# Patient Record
Sex: FEMALE | Race: OTHER
[De-identification: names, ages, dates, MRNs, and addresses within clinical notes are randomized per-mention and may not be internally consistent; named-entity substitution may affect disease eponyms.]

---

## 2020-04-13 ENCOUNTER — HOSPITAL ENCOUNTER (EMERGENCY)
Dept: HOSPITAL 61 - ER | Age: 40
LOS: 1 days | Discharge: HOME | End: 2020-04-14
Payer: SELF-PAY

## 2020-04-13 VITALS — HEIGHT: 64 IN | WEIGHT: 132.28 LBS | BODY MASS INDEX: 22.58 KG/M2

## 2020-04-13 DIAGNOSIS — Z87.442: ICD-10-CM

## 2020-04-13 DIAGNOSIS — R10.31: Primary | ICD-10-CM

## 2020-04-13 DIAGNOSIS — R11.0: ICD-10-CM

## 2020-04-13 LAB
ALBUMIN SERPL-MCNC: 3.5 G/DL (ref 3.4–5)
ALBUMIN/GLOB SERPL: 0.9 {RATIO} (ref 1–1.7)
ALP SERPL-CCNC: 52 U/L (ref 46–116)
ALT SERPL-CCNC: 18 U/L (ref 14–59)
ANION GAP SERPL CALC-SCNC: 8 MMOL/L (ref 6–14)
APTT PPP: YELLOW S
AST SERPL-CCNC: 16 U/L (ref 15–37)
BACTERIA #/AREA URNS HPF: 0 /HPF
BASOPHILS # BLD AUTO: 0 X10^3/UL (ref 0–0.2)
BASOPHILS NFR BLD: 0 % (ref 0–3)
BILIRUB SERPL-MCNC: 0.4 MG/DL (ref 0.2–1)
BILIRUB UR QL STRIP: NEGATIVE
BUN SERPL-MCNC: 12 MG/DL (ref 7–20)
BUN/CREAT SERPL: 17 (ref 6–20)
CALCIUM SERPL-MCNC: 8.9 MG/DL (ref 8.5–10.1)
CHLORIDE SERPL-SCNC: 105 MMOL/L (ref 98–107)
CO2 SERPL-SCNC: 26 MMOL/L (ref 21–32)
CREAT SERPL-MCNC: 0.7 MG/DL (ref 0.6–1)
EOSINOPHIL NFR BLD: 0.2 X10^3/UL (ref 0–0.7)
EOSINOPHIL NFR BLD: 2 % (ref 0–3)
ERYTHROCYTE [DISTWIDTH] IN BLOOD BY AUTOMATED COUNT: 12.4 % (ref 11.5–14.5)
FIBRINOGEN PPP-MCNC: (no result) MG/DL
GFR SERPLBLD BASED ON 1.73 SQ M-ARVRAT: 93.2 ML/MIN
GLOBULIN SER-MCNC: 4 G/DL (ref 2.2–3.8)
GLUCOSE SERPL-MCNC: 119 MG/DL (ref 70–99)
HCT VFR BLD CALC: 42.2 % (ref 36–47)
HGB BLD-MCNC: 14.6 G/DL (ref 12–15.5)
LYMPHOCYTES # BLD: 2.8 X10^3/UL (ref 1–4.8)
LYMPHOCYTES NFR BLD AUTO: 36 % (ref 24–48)
MCH RBC QN AUTO: 31 PG (ref 25–35)
MCHC RBC AUTO-ENTMCNC: 35 G/DL (ref 31–37)
MCV RBC AUTO: 91 FL (ref 79–100)
MONO #: 0.8 X10^3/UL (ref 0–1.1)
MONOCYTES NFR BLD: 10 % (ref 0–9)
NEUT #: 4 X10^3/UL (ref 1.8–7.7)
NEUTROPHILS NFR BLD AUTO: 52 % (ref 31–73)
NITRITE UR QL STRIP: NEGATIVE
PH UR STRIP: 5.5 [PH]
PLATELET # BLD AUTO: 300 X10^3/UL (ref 140–400)
POTASSIUM SERPL-SCNC: 3.7 MMOL/L (ref 3.5–5.1)
PROT SERPL-MCNC: 7.5 G/DL (ref 6.4–8.2)
PROT UR STRIP-MCNC: NEGATIVE MG/DL
RBC # BLD AUTO: 4.65 X10^6/UL (ref 3.5–5.4)
RBC #/AREA URNS HPF: 0 /HPF (ref 0–2)
SODIUM SERPL-SCNC: 139 MMOL/L (ref 136–145)
SQUAMOUS #/AREA URNS LPF: (no result) /LPF
UROBILINOGEN UR-MCNC: 0.2 MG/DL
WBC # BLD AUTO: 7.8 X10^3/UL (ref 4–11)
WBC #/AREA URNS HPF: (no result) /HPF (ref 0–4)

## 2020-04-13 PROCEDURE — 85025 COMPLETE CBC W/AUTO DIFF WBC: CPT

## 2020-04-13 PROCEDURE — 81001 URINALYSIS AUTO W/SCOPE: CPT

## 2020-04-13 PROCEDURE — 36415 COLL VENOUS BLD VENIPUNCTURE: CPT

## 2020-04-13 PROCEDURE — 74177 CT ABD & PELVIS W/CONTRAST: CPT

## 2020-04-13 PROCEDURE — 81025 URINE PREGNANCY TEST: CPT

## 2020-04-13 PROCEDURE — 99285 EMERGENCY DEPT VISIT HI MDM: CPT

## 2020-04-13 PROCEDURE — 80053 COMPREHEN METABOLIC PANEL: CPT

## 2020-04-13 NOTE — PHYS DOC
Past Medical History


Past Medical History:  Other


Additional Past Medical Histor:  kidney stones


Past Surgical History:  No Surgical History


Smoking Status:  Never Smoker


Alcohol Use:  None





General Adult


EDM:


Chief Complaint:  ABDOMINAL PAIN





HPI:


HPI:





Patient is a 39  year old female who presents with complaint of right flank pain

that started approximately 3 hours prior to arrival. Patient indicates that pain

was initially quite severe but she took some Tylenol before coming in and pain 

has improved dramatically. She states that she was also nauseated at the time b

ut nausea has since resolved. She denies any fever or diarrhea. She denies any 

urinary discomfort. Patient states that she has a history of kidney stones and 

wonders if she may have a kidney stone again.[]





Review of Systems:


Review of Systems:


Constitutional:  Denies fever or chills. []


Respiratory:  Denies cough or shortness of breath. [] 


Cardiovascular:  Denies chest pain or edema. [] 


GI: Complains of right flank pain with nausea. Denies vomiting or diarrhea. [] 


:  Denies dysuria. [] 


Neurologic:  Denies headache, focal weakness or sensory changes. [] 





A full 10 point review of systems has been reviewed and is otherwise negative.





Heart Score:


Risk Factors:


Risk Factors:  DM, Current or recent (<one month) smoker, HTN, HLP, family 

history of CAD, obesity.


Risk Scores:


Score 0 - 3:  2.5% MACE over next 6 weeks - Discharge Home


Score 4 - 6:  20.3% MACE over next 6 weeks - Admit for Clinical Observation


Score 7 - 10:  72.7% MACE over next 6 weeks - Early Invasive Strategies





Current Medications:





Current Medications








 Medications


  (Trade)  Dose


 Ordered  [facility]  Start Time


 Stop Time Status Last Admin


Dose Admin


 


 Sodium Chloride  1,000 ml @ 


 1,000 mls/hr  Q1H  4/13/20 23:09


 4/14/20 00:08 UNV  














Allergies:


Allergies:





Allergies








Coded Allergies Type Severity Reaction Last Updated Verified


 


  No Known Drug Allergies    4/13/20 No











Physical Exam:


PE:





Constitutional: Well developed, well nourished, no acute distress, non-toxic 

appearance. []


HENT: Normocephalic, atraumatic, bilateral external ears normal, oropharynx 

moist, no oral exudates, nose normal. []


Eyes: PERRLA, EOMI, conjunctiva normal, no discharge. [] 


Neck: Normal range of motion, no tenderness, supple, no stridor. [] 


Cardiovascular: Regular rate and rhythm[]


Lungs & Thorax:  Bilateral breath sounds clear to auscultation []


Abdomen: Bowel sounds normal, soft, mild right lower quadrant tenderness. [] 


Skin: Warm, dry, no erythema, no rash. [] 


Extremities: No tenderness, no cyanosis, no clubbing, ROM intact. [] 


Neurologic: Alert and oriented X 3, no focal deficits noted. []





EKG:


EKG:


[]





Radiology/Procedures:


Radiology/Procedures:


[]


Impression:


PROCEDURE: CT ABD PELV W/ IV CONTRST ONLY





EXAM: CT Abdomen and Pelvis with IV contrast


 


CLINICAL HISTORY: Right lower quadrant abdominal pain. 


 


COMPARISON: none


 


TECHNIQUE: Helical CT of the abdomen and pelvis was performed following 


the administration of IV contrast. Axial, coronal and sagittal reformatted


images were generated.


 


---PQRS compliance statement - One or more of the following individualized


dose reduction techniques were utilized for this study:


1.  Automated exposure control


2.  Adjustment of the mA and/or kV according to patient size


3.  Use of iterative reconstruction technique---


 


FINDINGS:


Lower chest: Lung bases are clear.


 


Abdomen and pelvis:


Liver and biliary system: No focal liver lesion.  Gallbladder is 


distended. Small gallstone is seen. No biliary ductal dilatation.


 


Spleen: Unremarkable


 


Pancreas: Unremarkable


 


Adrenal glands: Unremarkable


 


Kidneys: Symmetric nephrograms. Nonobstructing left interpolar renal 


calculus. No hydronephrosis or hydroureter.


 


Lymph nodes/retroperitoneum: No abdominal or pelvic lymphadenopathy.


 


Vessels: Aorta is normal in caliber.


 


Bowel/Peritoneal cavity: Moderate colonic stool content is seen. No small 


or large bowel dilatation. Appendix is normal.  


No abdominal or pelvic ascites. 


 


Abdominal wall: Small fat-containing. Local hernia.


 


Bladder: Mild bladder wall thickening, nonspecific but may represent 


cystitis.


 


Bones: Osseous structures are grossly unremarkable.


 


IMPRESSION:


1.  Mild bladder wall thickening, nonspecific but may represent cystitis


2.  Appendix is normal.


3.  Gallbladder is distended. Small gallstone is noted. No CT evidence for


acute cholecystitis.


4.  No bowel obstruction.


 


Electronically signed by: Saad Garcia MD (4/14/2020 12:59 AM) [facility]














DICTATED and SIGNED BY:     SAAD GARCIA MD


DATE:     04/14/20 0059





Course & Med Decision Making:


Course & Med Decision Making


Pertinent Labs and Imaging studies reviewed. (See chart for details)





[]





Dragon Disclaimer:


Dragon Disclaimer:


This electronic medical record was generated, in whole or in part, using a voice

 recognition dictation system.





Departure


Departure


Impression:  


   Primary Impression:  


   Right flank pain


Disposition:  01 HOME, SELF-CARE


Condition:  STABLE


Referrals:  


NO PCP (PCP)


Patient Instructions:  Flank Pain


Scripts


Acetaminophen With Codeine (TYLENOL WITH CODEINE #3 TABLET) 1 Each Tablet


1 TAB PO PRN Q6HRS PRN for PAIN, #12 TAB


   Prov: SAMEER DELEON Jr. DO         4/14/20 


Ondansetron (ONDANSETRON ODT) 4 Mg Tab.rapdis


1 TAB PO PRN Q6-8HRS PRN for NAUSEA, #15 TAB


   Prov: SAMEER DELEON Jr. DO         4/14/20











SAMEER DELEON Jr. DO          Apr 13, 2020 23:13

## 2020-04-14 VITALS — DIASTOLIC BLOOD PRESSURE: 74 MMHG | SYSTOLIC BLOOD PRESSURE: 132 MMHG

## 2020-04-14 NOTE — RAD
EXAM: CT Abdomen and Pelvis with IV contrast

 

CLINICAL HISTORY: Right lower quadrant abdominal pain. 

 

COMPARISON: none

 

TECHNIQUE: Helical CT of the abdomen and pelvis was performed following 

the administration of IV contrast. Axial, coronal and sagittal reformatted

images were generated.

 

---PQRS compliance statement - One or more of the following individualized

dose reduction techniques were utilized for this study:

1.  Automated exposure control

2.  Adjustment of the mA and/or kV according to patient size

3.  Use of iterative reconstruction technique---

 

FINDINGS:

Lower chest: Lung bases are clear.

 

Abdomen and pelvis:

Liver and biliary system: No focal liver lesion.  Gallbladder is 

distended. Small gallstone is seen. No biliary ductal dilatation.

 

Spleen: Unremarkable

 

Pancreas: Unremarkable

 

Adrenal glands: Unremarkable

 

Kidneys: Symmetric nephrograms. Nonobstructing left interpolar renal 

calculus. No hydronephrosis or hydroureter.

 

Lymph nodes/retroperitoneum: No abdominal or pelvic lymphadenopathy.

 

Vessels: Aorta is normal in caliber.

 

Bowel/Peritoneal cavity: Moderate colonic stool content is seen. No small 

or large bowel dilatation. Appendix is normal.  

No abdominal or pelvic ascites. 

 

Abdominal wall: Small fat-containing. Local hernia.

 

Bladder: Mild bladder wall thickening, nonspecific but may represent 

cystitis.

 

Bones: Osseous structures are grossly unremarkable.

 

IMPRESSION:

1.  Mild bladder wall thickening, nonspecific but may represent cystitis

2.  Appendix is normal.

3.  Gallbladder is distended. Small gallstone is noted. No CT evidence for

acute cholecystitis.

4.  No bowel obstruction.

 

Electronically signed by: Saad Garcia MD (4/14/2020 12:59 AM) [facility]